# Patient Record
(demographics unavailable — no encounter records)

---

## 2018-01-17 NOTE — RAD
PORTABLE AP CHEST X-RAY

1/17/18

 

HISTORY: 

Chest pain for eight days as well as back pain for two days and left arm numbness. 

 

COMPARISON:  

None available.

 

FINDINGS:  

The cardiac silhouette and pulmonary vasculature are within normal limits. The lungs are clear. Bronson
us structures are intact. 

 

IMPRESSION:  

No acute cardiopulmonary process. 

 

POS: [facility]

## 2018-01-17 NOTE — CT
NONCONTRAST CT ABDOMEN AND PELVIS

1/17/18

 

HISTORY: 

Chest pain intermittently for the last eight days. Patient states the pain now has radiated to the le
ft back. 

 

COMPARISON:  

5/8/14.

 

FINDINGS:  

The lung bases are clear. 

 

A stent is again present within the left renal vein.

 

There are several punctate nonobstructing bilateral renal calculi. There is no hydronephrosis or hydr
oureter. The course of the left ureter is excluded from view related to significant artifact from mul
tiple embolization coils along the course of the mid and distal left ovarian vein. No right ureteral 
calculi are seen. 

 

The liver, spleen, pancreas, and bilateral adrenal glands demonstrate a grossly normal nonenhanced CT
 appearance. The urinary bladder is incompletely distended but otherwise grossly normal in appearance
. The uterus has a grossly normal nonenhanced CT appearance. 

 

There is a moderate amount of retained fecal material seen throughout the colon suggesting constipati
on. 

 

What is thought to be the appendix is normal in caliber, but the appendix is difficult to visualize o
n this exam. No free fluid or fluid collection is seen in the abdomen or pelvis and no enlarged lymph
 nodes are seen by CT size criteria.

 

IMPRESSION:  

1.      Constipation. 

2.      Nonobstructing bilateral renal calculi. 

3.      No hydronephrosis is present. The mid and distal left ureter are obscured due to artifact fro
m embolization coils within the left ovarian vein. 

4.      While the appendix is not definitively visualized, a small tubular  structure in the pelvis i
s thought to represent the appendix which is normal in caliber, and there are no secondary signs to s
uggest appendicitis. 

 

POS: [facility]